# Patient Record
Sex: MALE | Race: WHITE | Employment: UNEMPLOYED | URBAN - METROPOLITAN AREA
[De-identification: names, ages, dates, MRNs, and addresses within clinical notes are randomized per-mention and may not be internally consistent; named-entity substitution may affect disease eponyms.]

---
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
## 2021-04-07 ENCOUNTER — IMMUNIZATION (OUTPATIENT)
Dept: VACCINE CLINIC | Facility: HOSPITAL | Age: 21
End: 2021-04-07

## 2021-04-07 PROCEDURE — 91300 HC SARSCOV02 VAC 30MCG/0.3ML IM: CPT | Performed by: INTERNAL MEDICINE

## 2021-04-07 PROCEDURE — 0001A: CPT | Performed by: INTERNAL MEDICINE

## 2021-04-28 ENCOUNTER — IMMUNIZATION (OUTPATIENT)
Dept: VACCINE CLINIC | Facility: HOSPITAL | Age: 21
End: 2021-04-28

## 2021-04-28 PROCEDURE — 91300 HC SARSCOV02 VAC 30MCG/0.3ML IM: CPT | Performed by: INTERNAL MEDICINE

## 2021-04-28 PROCEDURE — 0002A: CPT | Performed by: INTERNAL MEDICINE

## 2021-08-31 ENCOUNTER — APPOINTMENT (OUTPATIENT)
Dept: GENERAL RADIOLOGY | Facility: HOSPITAL | Age: 21
End: 2021-08-31

## 2021-08-31 ENCOUNTER — HOSPITAL ENCOUNTER (EMERGENCY)
Facility: HOSPITAL | Age: 21
Discharge: HOME | End: 2021-08-31
Attending: EMERGENCY MEDICINE | Admitting: EMERGENCY MEDICINE

## 2021-08-31 VITALS
BODY MASS INDEX: 26.41 KG/M2 | TEMPERATURE: 98.3 F | DIASTOLIC BLOOD PRESSURE: 70 MMHG | RESPIRATION RATE: 16 BRPM | HEIGHT: 72 IN | HEART RATE: 60 BPM | OXYGEN SATURATION: 100 % | SYSTOLIC BLOOD PRESSURE: 122 MMHG | WEIGHT: 195 LBS
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
## 2021-08-31 DIAGNOSIS — S13.9XXA NECK SPRAIN, INITIAL ENCOUNTER: Primary | ICD-10-CM

## 2021-08-31 PROCEDURE — 72040 X-RAY EXAM NECK SPINE 2-3 VW: CPT

## 2021-08-31 PROCEDURE — 99282 EMERGENCY DEPT VISIT SF MDM: CPT

## 2021-08-31 RX ORDER — METHOCARBAMOL 750 MG/1
750 TABLET, FILM COATED ORAL 3 TIMES DAILY PRN
Qty: 20 TABLET | Refills: 0 | OUTPATIENT
Start: 2021-08-31 | End: 2021-09-15

## 2021-08-31 RX ORDER — NAPROXEN 375 MG/1
375 TABLET ORAL 2 TIMES DAILY PRN
Qty: 14 TABLET | Refills: 0 | OUTPATIENT
Start: 2021-08-31 | End: 2021-09-15

## 2021-09-21 ENCOUNTER — OFFICE VISIT (OUTPATIENT)
Dept: INTERNAL MEDICINE | Facility: CLINIC | Age: 21
End: 2021-09-21

## 2021-09-21 ENCOUNTER — LAB (OUTPATIENT)
Dept: LAB | Facility: HOSPITAL | Age: 21
End: 2021-09-21

## 2021-09-21 ENCOUNTER — HOSPITAL ENCOUNTER (OUTPATIENT)
Dept: GENERAL RADIOLOGY | Facility: HOSPITAL | Age: 21
Discharge: HOME | End: 2021-09-21

## 2021-09-21 VITALS
HEIGHT: 72 IN | DIASTOLIC BLOOD PRESSURE: 80 MMHG | BODY MASS INDEX: 25.3 KG/M2 | TEMPERATURE: 98.2 F | HEART RATE: 110 BPM | WEIGHT: 186.8 LBS | OXYGEN SATURATION: 96 % | SYSTOLIC BLOOD PRESSURE: 104 MMHG

## 2021-09-21 DIAGNOSIS — Z13.220 LIPID SCREENING: ICD-10-CM

## 2021-09-21 DIAGNOSIS — Z11.3 ROUTINE SCREENING FOR STI (SEXUALLY TRANSMITTED INFECTION): ICD-10-CM

## 2021-09-21 DIAGNOSIS — Z11.59 NEED FOR HEPATITIS C SCREENING TEST: ICD-10-CM

## 2021-09-21 DIAGNOSIS — R10.30 LOWER ABDOMINAL PAIN: ICD-10-CM

## 2021-09-21 DIAGNOSIS — R10.30 LOWER ABDOMINAL PAIN: Primary | ICD-10-CM

## 2021-09-21 DIAGNOSIS — S69.92XA INJURY OF LEFT LITTLE FINGER, INITIAL ENCOUNTER: ICD-10-CM

## 2021-09-21 LAB
ALBUMIN SERPL-MCNC: 5.3 G/DL (ref 3.5–5.2)
ALBUMIN/GLOB SERPL: 2 G/DL
ALP SERPL-CCNC: 45 U/L (ref 39–117)
ALT SERPL W P-5'-P-CCNC: 15 U/L (ref 1–41)
ANION GAP SERPL CALCULATED.3IONS-SCNC: 11.7 MMOL/L (ref 5–15)
AST SERPL-CCNC: 20 U/L (ref 1–40)
BILIRUB SERPL-MCNC: 0.6 MG/DL (ref 0–1.2)
BUN SERPL-MCNC: 12 MG/DL (ref 6–20)
BUN/CREAT SERPL: 15.6 (ref 7–25)
CALCIUM SPEC-SCNC: 10.8 MG/DL (ref 8.6–10.5)
CHLORIDE SERPL-SCNC: 103 MMOL/L (ref 98–107)
CHOLEST SERPL-MCNC: 149 MG/DL (ref 0–200)
CO2 SERPL-SCNC: 27.3 MMOL/L (ref 22–29)
CREAT SERPL-MCNC: 0.77 MG/DL (ref 0.76–1.27)
CRP SERPL-MCNC: <0.3 MG/DL (ref 0–0.5)
DEPRECATED RDW RBC AUTO: 41.2 FL (ref 37–54)
ERYTHROCYTE [DISTWIDTH] IN BLOOD BY AUTOMATED COUNT: 12.7 % (ref 12.3–15.4)
ERYTHROCYTE [SEDIMENTATION RATE] IN BLOOD: 3 MM/HR (ref 0–15)
GFR SERPL CREATININE-BSD FRML MDRD: 129 ML/MIN/1.73
GLOBULIN UR ELPH-MCNC: 2.6 GM/DL
GLUCOSE SERPL-MCNC: 81 MG/DL (ref 65–99)
HCT VFR BLD AUTO: 47.3 % (ref 37.5–51)
HCV AB SER DONR QL: NORMAL
HDLC SERPL-MCNC: 50 MG/DL (ref 40–60)
HGB BLD-MCNC: 16.3 G/DL (ref 13–17.7)
LDLC SERPL CALC-MCNC: 91 MG/DL (ref 0–100)
LDLC/HDLC SERPL: 1.86 {RATIO}
LIPASE SERPL-CCNC: 17 U/L (ref 13–60)
MCH RBC QN AUTO: 30.6 PG (ref 26.6–33)
MCHC RBC AUTO-ENTMCNC: 34.5 G/DL (ref 31.5–35.7)
MCV RBC AUTO: 88.9 FL (ref 79–97)
PLATELET # BLD AUTO: 204 10*3/MM3 (ref 140–450)
PMV BLD AUTO: 11.1 FL (ref 6–12)
POTASSIUM SERPL-SCNC: 4.3 MMOL/L (ref 3.5–5.2)
PROT SERPL-MCNC: 7.9 G/DL (ref 6–8.5)
RBC # BLD AUTO: 5.32 10*6/MM3 (ref 4.14–5.8)
SODIUM SERPL-SCNC: 142 MMOL/L (ref 136–145)
TRIGL SERPL-MCNC: 31 MG/DL (ref 0–150)
TSH SERPL DL<=0.05 MIU/L-ACNC: 0.83 UIU/ML (ref 0.27–4.2)
VLDLC SERPL-MCNC: 8 MG/DL (ref 5–40)
WBC # BLD AUTO: 6.1 10*3/MM3 (ref 3.4–10.8)

## 2021-09-21 PROCEDURE — 86255 FLUORESCENT ANTIBODY SCREEN: CPT

## 2021-09-21 PROCEDURE — 80061 LIPID PANEL: CPT

## 2021-09-21 PROCEDURE — 99204 OFFICE O/P NEW MOD 45 MIN: CPT | Performed by: STUDENT IN AN ORGANIZED HEALTH CARE EDUCATION/TRAINING PROGRAM

## 2021-09-21 PROCEDURE — 84443 ASSAY THYROID STIM HORMONE: CPT

## 2021-09-21 PROCEDURE — 85652 RBC SED RATE AUTOMATED: CPT

## 2021-09-21 PROCEDURE — 87491 CHLMYD TRACH DNA AMP PROBE: CPT

## 2021-09-21 PROCEDURE — 83690 ASSAY OF LIPASE: CPT

## 2021-09-21 PROCEDURE — 86803 HEPATITIS C AB TEST: CPT

## 2021-09-21 PROCEDURE — 83516 IMMUNOASSAY NONANTIBODY: CPT

## 2021-09-21 PROCEDURE — 82784 ASSAY IGA/IGD/IGG/IGM EACH: CPT

## 2021-09-21 PROCEDURE — 73140 X-RAY EXAM OF FINGER(S): CPT

## 2021-09-21 PROCEDURE — 85027 COMPLETE CBC AUTOMATED: CPT

## 2021-09-21 PROCEDURE — 86140 C-REACTIVE PROTEIN: CPT

## 2021-09-21 PROCEDURE — 36415 COLL VENOUS BLD VENIPUNCTURE: CPT

## 2021-09-21 PROCEDURE — 80053 COMPREHEN METABOLIC PANEL: CPT

## 2021-09-21 PROCEDURE — 87591 N.GONORRHOEAE DNA AMP PROB: CPT

## 2021-09-21 NOTE — PROGRESS NOTES
Chief Complaint  Juan Manuel Roach is a 20 y.o. male presenting for Abdominal Pain ([facility] ) and dislocated pinky finger (left hand).     From [facility]. Parents live in [place]. Living in [place] for studies at [employer]. Would like to go to [employer] school. Lives with girlfriend.     Patient has past medical history of inflammatory mild comedonal acne.    History of Present Illness  Patient is here to establish care.    On Friday, 9/17/2021 he had a injury to his left fifth finger while playing ball.  Another player hit him as he was grabbing the ball, dislocating his fifth finger dorsally at the PIP joint.  Patient immediately repositioned and immobilized the finger.  He did have some swelling for the first days, but now significant improvement.  He did not go to urgent care for this.    He is also presenting with a 3 to 4-month history of abdominal pain.  This started gradually.  Localized more around right lower quadrant, partially left lower quadrant and midline.  No abdominal pain upper abdomen.  He describes the pain as constant.  He did go to urgent care after he 1 night was awake due to his abdominal pain.  There was no diet changes prior to this starting.  He has been taking probiotics and vitamins before this started, and he stopped taking these and symptoms improved somewhat.  He seems to tolerate it very well.  He has been having more loose bowels, but his baseline is 2-3 bowel movements per day.  No constipation.  He reports urgency at times, sometimes he has to go to the bathroom right after eating.  Otherwise no postprandial symptoms.  No blood or mucus in the stools.    He did have occasional abdominal pain in the past, more related to eating large meals.  His weight has been stable, his appetite is normal.  He exercises regularly.  No dysuria or increased urinary frequency.  No hematuria.  Patient recently did HIV test as routine.  He is amenable to  "doing STI testing for gonorrhea chlamydia, but is monogamous in his relationship.    The following portions of the patient's history were reviewed and updated as appropriate: allergies, current medications, past family history, past medical history, past social history, past surgical history and problem list.    Objective  /80 (BP Location: Left arm, Patient Position: Sitting, Cuff Size: Adult)   Pulse 110   Temp 98.2 °F (36.8 °C) (Temporal)   Ht 182 cm (71.65\")   Wt 84.7 kg (186 lb 12.8 oz)   SpO2 96%   BMI 25.58 kg/m²     Physical Exam  Vitals reviewed.   Constitutional:       Appearance: Normal appearance.   HENT:      Head: Normocephalic and atraumatic.      Nose: No congestion.   Eyes:      Extraocular Movements: Extraocular movements intact.      Conjunctiva/sclera: Conjunctivae normal.   Cardiovascular:      Rate and Rhythm: Normal rate and regular rhythm.      Heart sounds: Normal heart sounds. No murmur heard.     Pulmonary:      Effort: Pulmonary effort is normal.      Breath sounds: Normal breath sounds.   Abdominal:      General: Bowel sounds are normal. There is no distension.      Palpations: Abdomen is soft. There is no mass.      Tenderness: There is abdominal tenderness. There is no guarding or rebound.      Comments: Mild tenderness to palpation deep and right lower quadrant and left lower quadrant.  Nontender epigastrium and upper abdomen.   Musculoskeletal:         General: Signs of injury present. No swelling.      Cervical back: Neck supple.      Right lower leg: No edema.      Left lower leg: No edema.      Comments: Left fifth finger: No significant swelling.  Tenderness over MCP and PIP.  Good ROM, able to make a fist without malrotation.  Intact ability to flex and extend in DIP, PIP and MCP.   Skin:     General: Skin is warm and dry.   Neurological:      Mental Status: He is alert and oriented to person, place, and time. Mental status is at baseline.   Psychiatric:         " Behavior: Behavior normal.         Thought Content: Thought content normal.         Assessment/Plan   1. Lower abdominal pain  Sofar undetermined cause.  May be IBS, but will have to rule out IBD, celiac disease, pancreatic insufficiency, thyroid disease.  Patient is reporting some improvement.  He will return for follow-up visit in 4 weeks for annual physical and will get in touch sooner if symptoms worsen.  - Celiac Comprehensive Panel; Future  - Comprehensive Metabolic Panel; Future  - C-reactive Protein; Future  - Sedimentation Rate; Future  - TSH Rfx On Abnormal To Free T4; Future  - Lipase; Future  - Calprotectin, Fecal - Stool, Per Rectum; Future  - CBC (No Diff); Future    2. Injury of left little finger, initial encounter  No clinical concern for tendon injury.  We will do x-ray to rule out fracture, but seems less likely.  - XR Finger 2+ View Left (In Office)    3. Lipid screening  Patient never had lipids checked in the past.  - Lipid Panel; Future    4. Need for hepatitis C screening test  We will screen as recommended.  - Hepatitis C Antibody; Future    5. Routine screening for STI (sexually transmitted infection)  - Chlamydia trachomatis, Neisseria gonorrhoeae, PCR - Urine, Urine, Random Void; Future    Total time spent on chart review, charting and face-to-face with patient 45 minutes.    Return in about 4 weeks (around 10/19/2021) for Annual physical.    Future Appointments       Provider Department Center    10/21/2021 8:30 AM Kaushal Huerta MD [facility] INTERNAL MEDICINE [place]            Kaushal Huerta MD  Family Medicine  09/21/2021    Note: Speech recognition transcription software may have been used to create portions of this document.  An attempt at proofreading has been made but errors in transcription could still be present.

## 2021-09-22 ENCOUNTER — TELEPHONE (OUTPATIENT)
Dept: INTERNAL MEDICINE | Facility: CLINIC | Age: 21
End: 2021-09-22

## 2021-09-22 NOTE — TELEPHONE ENCOUNTER
----- Message from Kaushal Huerta MD sent at 9/22/2021  7:53 AM EDT -----  Blood tests show no liver infection with hepatitis C virus.The test CRP or C-reactive protein and sed rate to check for inflammation are both normal.Thyroid function is normal.Liver, kidney and pancreas tests are normal.Calcium is mildly elevated, but this tends to fluctuate a little bit so I would not be concerned.Cholesterol levels are normal.White count including white cells, hemoglobin and platelets are normal.Please let patient know.  Also check if he needs assistance to activate MyChart.  Thanks

## 2021-09-23 ENCOUNTER — TELEPHONE (OUTPATIENT)
Dept: INTERNAL MEDICINE | Facility: CLINIC | Age: 21
End: 2021-09-23

## 2021-09-23 PROBLEM — S62.629A CLOSED AVULSION FRACTURE OF MIDDLE PHALANX OF FINGER: Status: ACTIVE | Noted: 2021-09-23

## 2021-09-23 LAB
ENDOMYSIUM IGA SER QL: NEGATIVE
GLIADIN PEPTIDE IGA SER-ACNC: 3 UNITS (ref 0–19)
GLIADIN PEPTIDE IGG SER-ACNC: 2 UNITS (ref 0–19)
IGA SERPL-MCNC: 119 MG/DL (ref 90–386)
TTG IGA SER-ACNC: <2 U/ML (ref 0–3)
TTG IGG SER-ACNC: <2 U/ML (ref 0–5)

## 2021-09-23 NOTE — TELEPHONE ENCOUNTER
----- Message from Kaushal Huerta MD sent at 9/23/2021  8:08 AM EDT -----  See below and please let patient know.

## 2021-09-23 NOTE — TELEPHONE ENCOUNTER
----- Message from Kaushal Huerta MD sent at 9/23/2021  8:08 AM EDT -----  See below and please let patient know.        The x-ray of your left little finger showed a small fracture/bone fragment of the middle bone close to the first joint of the finger that you dislocated.  I would recommend you continue immobilizing this with some form of stiff material like you had on when I saw you in my office.  I would immobilize it together with the next finger.  I would also recommend referral to hand surgeon for follow-up.  ...

## 2021-09-23 NOTE — TELEPHONE ENCOUNTER
Called patient advised per note he said he would be interested in seeing the hand surgeon but he is going to check with his mom's insurance to see if it is paid when he went to get X-ray it cost him $90.00

## 2021-09-24 LAB
C TRACH RRNA SPEC QL NAA+PROBE: NEGATIVE
N GONORRHOEA RRNA SPEC QL NAA+PROBE: NEGATIVE

## 2021-11-23 ENCOUNTER — NURSE TRIAGE (OUTPATIENT)
Dept: OTHER | Facility: CLINIC | Age: 21
End: 2021-11-23

## 2021-11-23 ENCOUNTER — TELEPHONE (OUTPATIENT)
Dept: FAMILY MEDICINE CLINIC | Age: 21
End: 2021-11-23

## 2021-11-23 NOTE — TELEPHONE ENCOUNTER
Received call from [facility] at [facility] with Red Flag Complaint. Brief description of triage: Patient with abdominal pain for a couple weeks, blood in stool yesterday x2. Triage indicates for patient to go to ED/UC now or to PCP with approval.      Care advice provided, patient verbalizes understanding; denies any other questions or concerns; instructed to call back for any new or worsening symptoms. 2nd level triage attempted with [facility] for Dr. Christin Hastings. That office did not assume care of the patient, left on hold for 15 minutes. At that point I referred patient to the nearest ED and he said he would get checked out. Unsure if patient will comply. Attention Provider: Thank you for allowing me to participate in the care of your patient. The patient was connected to triage in response to information provided to the ECC/PSC. Please do not respond through this encounter as the response is not directed to a shared pool. Reason for Disposition   Constant abdominal pain lasting > 2 hours    Answer Assessment - Initial Assessment Questions  1. APPEARANCE of BLOOD: \"What color is it? \" \"Is it passed separately, on the surface of the stool, or mixed in with the stool? \"       Mixed in with stool - bright red upon wiping    2. AMOUNT: \"How much blood was passed? \"      A couple Tablespoons worth    3. FREQUENCY: \"How many times has blood been passed with the stools? \"       2 x yesterday - none today so far    4. ONSET: \"When was the blood first seen in the stools? \" (Days or weeks)       Yesterday 11/22/21    5. DIARRHEA: \"Is there also some diarrhea? \" If so, ask: \"How many diarrhea stools were passed in past 24 hours? \"       Yes - Diarrhea 3x in past 24 hours    6. CONSTIPATION: \"Do you have constipation? \" If so, \"How bad is it? \"      No     7. RECURRENT SYMPTOMS: \"Have you had blood in your stools before? \" If so, ask: \"When was the last time? \" and \"What happened that time? \"       No    8. BLOOD THINNERS: \"Do you take any blood thinners? \" (e.g., Coumadin/warfarin, Pradaxa/dabigatran, aspirin)      NO    9. OTHER SYMPTOMS: \"Do you have any other symptoms? \"  (e.g., abdominal pain, vomiting, dizziness, fever)      Abdominal pain    10. PREGNANCY: \"Is there any chance you are pregnant? \" \"When was your last menstrual period? \"        NA    Protocols used: RECTAL BLEEDING-ADULT-[place]

## 2021-11-23 NOTE — TELEPHONE ENCOUNTER
LM with patient to notify that Pt can reestablish with new provider. Pt hasn't been seen since 2018 by Kunal Jiang.

## 2021-11-23 NOTE — TELEPHONE ENCOUNTER
LM with patient to notify that Pt can reestablish with new provider.  Pt hasn't been seen since 2018 by Tamara Ambriz

## 2022-01-18 ENCOUNTER — OFFICE VISIT (OUTPATIENT)
Dept: FAMILY MEDICINE CLINIC | Age: 22
End: 2022-01-18
Payer: COMMERCIAL

## 2022-01-18 VITALS
HEIGHT: 72 IN | BODY MASS INDEX: 26.55 KG/M2 | TEMPERATURE: 97.2 F | OXYGEN SATURATION: 98 % | HEART RATE: 69 BPM | DIASTOLIC BLOOD PRESSURE: 58 MMHG | WEIGHT: 196 LBS | SYSTOLIC BLOOD PRESSURE: 118 MMHG

## 2022-01-18 DIAGNOSIS — K58.0 IRRITABLE BOWEL SYNDROME WITH DIARRHEA: ICD-10-CM

## 2022-01-18 DIAGNOSIS — Z76.89 ENCOUNTER TO ESTABLISH CARE: Primary | ICD-10-CM

## 2022-01-18 DIAGNOSIS — S62.607P: ICD-10-CM

## 2022-01-18 PROCEDURE — 99204 OFFICE O/P NEW MOD 45 MIN: CPT | Performed by: NURSE PRACTITIONER

## 2022-01-18 ASSESSMENT — ENCOUNTER SYMPTOMS
ABDOMINAL PAIN: 1
RESPIRATORY NEGATIVE: 1
DIARRHEA: 1

## 2022-01-18 ASSESSMENT — PATIENT HEALTH QUESTIONNAIRE - PHQ9
1. LITTLE INTEREST OR PLEASURE IN DOING THINGS: 0
SUM OF ALL RESPONSES TO PHQ QUESTIONS 1-9: 0
SUM OF ALL RESPONSES TO PHQ QUESTIONS 1-9: 0
SUM OF ALL RESPONSES TO PHQ9 QUESTIONS 1 & 2: 0
SUM OF ALL RESPONSES TO PHQ QUESTIONS 1-9: 0
2. FEELING DOWN, DEPRESSED OR HOPELESS: 0
SUM OF ALL RESPONSES TO PHQ QUESTIONS 1-9: 0

## 2022-01-18 NOTE — PROGRESS NOTES
Patient: Cheryl Joseph is a 24 y.o. male who presents today with the following Chief Complaint(s):  Chief Complaint   Patient presents with   Abdi Carreno New Doctor       Assessment:  Encounter Diagnoses   Name Primary?  Encounter to establish care Yes    Closed nondisplaced fracture of phalanx of left little finger with malunion, unspecified phalanx, subsequent encounter     Irritable bowel syndrome with diarrhea        Plan:  1. Encounter to establish care  See below    2. Closed nondisplaced fracture of phalanx of left little finger with malunion, unspecified phalanx, subsequent encounter  Fracture seems to have healed incorrectly. We will have patient see orthopedic specialist to further evaluate. - Tyler Brito MD, Orthopedic Surgery, [facility]    3. Irritable bowel syndrome with diarrhea  Salvador treat with Xifaxan  3 times a day for 2 weeks. Patient will let me know if the cost is too much, if that is the case we will switch to Bentyl. Patient educated on use and side effects will follow-up if no improvement. HPI   Patient presents today to reestablish care. He was a previous patient of Dr. Marcela Gandhi. He has concerns today of a left hand pinky finger fracture. The injury occurred in September 2021. He states he did splint it for a while and then had to remove the splint to be able to type for school. Patient states it does seem to have healed abnormally. Patient denies any pain numbness or tingling but would like to have it looked at. He also has some stomach discomfort concerns. He states sometimes with certain foods that he eats he will get some abdominal cramping and diarrhea. He states he has not really found any pattern with certain foods that seems to be happening with anything. He tried taking a probiotic and it seemed to make things worse and caused him to feel gassy.   He states he does not feel that it is related to dairy products because he can drink a glass of milk and that is fine however if he eats macaroni and cheese that it causes him to have the diarrhea. He was tested with [facility] for celiac's and that was negative. Current Outpatient Medications   Medication Sig Dispense Refill    rifaximin (XIFAXAN) 550 MG tablet Take 1 tablet by mouth 3 times daily for 14 days 42 tablet 0     No current facility-administered medications for this visit. Patient's past medical history, surgical history, family history, medications,and allergies  were all reviewed and updated as appropriate today. Review of Systems   Constitutional: Negative. HENT: Negative. Respiratory: Negative. Cardiovascular: Negative. Negative for chest pain and palpitations. Gastrointestinal: Positive for abdominal pain and diarrhea. Musculoskeletal: Negative. Skin: Negative. Neurological: Negative. Negative for dizziness and headaches. Psychiatric/Behavioral: Negative. Physical Exam  Vitals reviewed. Cardiovascular:      Rate and Rhythm: Normal rate and regular rhythm. Heart sounds: Normal heart sounds. Pulmonary:      Effort: Pulmonary effort is normal.      Breath sounds: Normal breath sounds. Abdominal:      General: Bowel sounds are increased. Tenderness: There is generalized abdominal tenderness. Musculoskeletal:      Comments: Left pinky finger slightly deformed unable to completely straighten. Nontender to palpation. Skin:     General: Skin is warm and dry. Neurological:      Mental Status: He is alert and oriented to person, place, and time. Vitals:    01/18/22 1605   BP: (!) 118/58   Pulse: 69   Temp: 97.2 °F (36.2 °C)   SpO2: 98%       This chart was generated using the Dragon dictation system. I created this record but it may contain dictation errors due to the limitation of the software.

## 2024-04-18 ENCOUNTER — OFFICE VISIT (OUTPATIENT)
Dept: FAMILY MEDICINE CLINIC | Age: 24
End: 2024-04-18
Payer: COMMERCIAL

## 2024-04-18 ENCOUNTER — TELEPHONE (OUTPATIENT)
Dept: FAMILY MEDICINE CLINIC | Age: 24
End: 2024-04-18

## 2024-04-18 VITALS
HEART RATE: 61 BPM | SYSTOLIC BLOOD PRESSURE: 114 MMHG | WEIGHT: 189.4 LBS | OXYGEN SATURATION: 98 % | DIASTOLIC BLOOD PRESSURE: 74 MMHG | BODY MASS INDEX: 25.65 KG/M2 | HEIGHT: 72 IN

## 2024-04-18 DIAGNOSIS — Z87.820 HISTORY OF CONCUSSION: ICD-10-CM

## 2024-04-18 DIAGNOSIS — F19.11 HISTORY OF SUBSTANCE ABUSE (HCC): ICD-10-CM

## 2024-04-18 DIAGNOSIS — Z02.1 DRUG SCREENING, PRE-EMPLOYMENT: ICD-10-CM

## 2024-04-18 DIAGNOSIS — Z00.00 WELLNESS EXAMINATION: Primary | ICD-10-CM

## 2024-04-18 DIAGNOSIS — Z87.828 HISTORY OF HEAT STROKE: ICD-10-CM

## 2024-04-18 LAB — ANNOTATION COMMENT IMP: NORMAL

## 2024-04-18 PROCEDURE — 99395 PREV VISIT EST AGE 18-39: CPT | Performed by: NURSE PRACTITIONER

## 2024-04-18 SDOH — ECONOMIC STABILITY: HOUSING INSECURITY
IN THE LAST 12 MONTHS, WAS THERE A TIME WHEN YOU DID NOT HAVE A STEADY PLACE TO SLEEP OR SLEPT IN A SHELTER (INCLUDING NOW)?: NO

## 2024-04-18 SDOH — ECONOMIC STABILITY: FOOD INSECURITY: WITHIN THE PAST 12 MONTHS, YOU WORRIED THAT YOUR FOOD WOULD RUN OUT BEFORE YOU GOT MONEY TO BUY MORE.: NEVER TRUE

## 2024-04-18 SDOH — ECONOMIC STABILITY: FOOD INSECURITY: WITHIN THE PAST 12 MONTHS, THE FOOD YOU BOUGHT JUST DIDN'T LAST AND YOU DIDN'T HAVE MONEY TO GET MORE.: NEVER TRUE

## 2024-04-18 SDOH — ECONOMIC STABILITY: INCOME INSECURITY: HOW HARD IS IT FOR YOU TO PAY FOR THE VERY BASICS LIKE FOOD, HOUSING, MEDICAL CARE, AND HEATING?: NOT HARD AT ALL

## 2024-04-18 ASSESSMENT — ENCOUNTER SYMPTOMS
GASTROINTESTINAL NEGATIVE: 1
RESPIRATORY NEGATIVE: 1

## 2024-04-18 ASSESSMENT — PATIENT HEALTH QUESTIONNAIRE - PHQ9
SUM OF ALL RESPONSES TO PHQ QUESTIONS 1-9: 0
2. FEELING DOWN, DEPRESSED OR HOPELESS: NOT AT ALL
SUM OF ALL RESPONSES TO PHQ QUESTIONS 1-9: 0
SUM OF ALL RESPONSES TO PHQ9 QUESTIONS 1 & 2: 0
SUM OF ALL RESPONSES TO PHQ QUESTIONS 1-9: 0
SUM OF ALL RESPONSES TO PHQ QUESTIONS 1-9: 0
1. LITTLE INTEREST OR PLEASURE IN DOING THINGS: NOT AT ALL

## 2024-04-18 NOTE — PROGRESS NOTES
Negative.    Psychiatric/Behavioral: Negative.           Physical Exam  Vitals reviewed.   HENT:      Right Ear: Tympanic membrane, ear canal and external ear normal.      Left Ear: Tympanic membrane, ear canal and external ear normal.      Mouth/Throat:      Mouth: Mucous membranes are moist.      Pharynx: No oropharyngeal exudate.   Eyes:      Conjunctiva/sclera: Conjunctivae normal.   Cardiovascular:      Rate and Rhythm: Normal rate and regular rhythm.      Heart sounds: Normal heart sounds.   Pulmonary:      Effort: Pulmonary effort is normal.      Breath sounds: Normal breath sounds.   Skin:     General: Skin is warm and dry.   Neurological:      Mental Status: He is alert and oriented to person, place, and time.   Psychiatric:         Mood and Affect: Mood normal.         Behavior: Behavior normal.       Vitals:    04/18/24 0946   BP: 114/74   Pulse: 61   SpO2: 98%       This chart was generated using the Dragon dictation system.  I created this record but it may contain dictation errors due to the limitation of the software.

## 2024-04-22 LAB
